# Patient Record
Sex: FEMALE | Race: WHITE | NOT HISPANIC OR LATINO | URBAN - METROPOLITAN AREA
[De-identification: names, ages, dates, MRNs, and addresses within clinical notes are randomized per-mention and may not be internally consistent; named-entity substitution may affect disease eponyms.]

---

## 2019-01-18 ENCOUNTER — IMPORTED ENCOUNTER (OUTPATIENT)
Dept: URBAN - METROPOLITAN AREA CLINIC 50 | Facility: CLINIC | Age: 59
End: 2019-01-18

## 2020-01-14 NOTE — PATIENT DISCUSSION
[place] Visual Field 36 point screen: I have reviewed the visual felipe, performed by Dr. Adelita Luong, both taped and untaped on this patient which demonstrate significant obstruction of the patient's peripheral visual field on both eyes.

## 2021-04-18 ASSESSMENT — VISUAL ACUITY
OS_SC: 20/40
OS_PH: @ 12 IN
OD_SC: 20/50
OD_BAT: 20/25
OS_OTHER: 20/30. 20/50.
OS_BAT: 20/30
OD_PH: @ 12 IN
OD_OTHER: 20/25. 20/30.

## 2021-04-18 ASSESSMENT — TONOMETRY
OS_IOP_MMHG: 15
OD_IOP_MMHG: 15

## 2021-04-23 ENCOUNTER — PREPPED CHART (OUTPATIENT)
Dept: URBAN - METROPOLITAN AREA CLINIC 53 | Facility: CLINIC | Age: 61
End: 2021-04-23

## 2021-04-26 ENCOUNTER — COMPREHENSIVE EXAM (OUTPATIENT)
Dept: URBAN - METROPOLITAN AREA CLINIC 53 | Facility: CLINIC | Age: 61
End: 2021-04-26

## 2021-04-26 DIAGNOSIS — H52.03: ICD-10-CM

## 2021-04-26 DIAGNOSIS — H25.13: ICD-10-CM

## 2021-04-26 DIAGNOSIS — H52.202: ICD-10-CM

## 2021-04-26 PROCEDURE — 92014 COMPRE OPH EXAM EST PT 1/>: CPT

## 2021-04-26 PROCEDURE — 92015 DETERMINE REFRACTIVE STATE: CPT

## 2021-04-26 ASSESSMENT — TONOMETRY
OS_IOP_MMHG: 16
OD_IOP_MMHG: 16

## 2021-04-26 ASSESSMENT — VISUAL ACUITY
OS_SC: 20/50
OD_SC: 20/60
OS_PH: 20/30
OD_PH: 20/25

## 2022-05-23 ENCOUNTER — COMPREHENSIVE EXAM (OUTPATIENT)
Dept: URBAN - METROPOLITAN AREA CLINIC 53 | Facility: CLINIC | Age: 62
End: 2022-05-23

## 2022-05-23 DIAGNOSIS — H43.813: ICD-10-CM

## 2022-05-23 DIAGNOSIS — H25.13: ICD-10-CM

## 2022-05-23 PROCEDURE — 92014 COMPRE OPH EXAM EST PT 1/>: CPT

## 2022-05-23 ASSESSMENT — VISUAL ACUITY
OS_GLARE: 20/40
OU_SC: J7
OD_SC: 20/50-3
OD_GLARE: 20/40
OD_GLARE: 20/40
OS_SC: 20/50
OS_GLARE: 20/40
OU_SC: 20/40

## 2022-05-23 ASSESSMENT — TONOMETRY
OD_IOP_MMHG: 14
OS_IOP_MMHG: 14

## 2022-06-14 ENCOUNTER — DIAGNOSTICS ONLY (OUTPATIENT)
Dept: URBAN - METROPOLITAN AREA CLINIC 53 | Facility: CLINIC | Age: 62
End: 2022-06-14

## 2022-06-14 DIAGNOSIS — H25.13: ICD-10-CM

## 2022-06-14 PROCEDURE — 92025IOL CORNEAL TOPOGRAPHY PREMIUM IOL

## 2022-06-14 PROCEDURE — 92136 OPHTHALMIC BIOMETRY: CPT

## 2022-06-14 ASSESSMENT — KERATOMETRY
OD_AXISANGLE2_DEGREES: 10
OS_AXISANGLE_DEGREES: 90
OD_AXISANGLE_DEGREES: 100
OD_K2POWER_DIOPTERS: 43.00
OS_AXISANGLE2_DEGREES: 180
OD_K1POWER_DIOPTERS: 43.62
OS_K1POWER_DIOPTERS: 44.12
OS_K2POWER_DIOPTERS: 43.62

## 2022-06-24 ENCOUNTER — PRE-OP/H&P (OUTPATIENT)
Dept: URBAN - METROPOLITAN AREA CLINIC 49 | Facility: CLINIC | Age: 62
End: 2022-06-24

## 2022-06-24 DIAGNOSIS — H43.813: ICD-10-CM

## 2022-06-24 DIAGNOSIS — H25.13: ICD-10-CM

## 2022-06-24 PROCEDURE — 92134 CPTRZ OPH DX IMG PST SGM RTA: CPT

## 2022-06-24 PROCEDURE — PREOP PRE OP VISIT

## 2022-06-24 ASSESSMENT — TONOMETRY
OS_IOP_MMHG: 16
OD_IOP_MMHG: 16

## 2022-06-24 ASSESSMENT — VISUAL ACUITY
OS_SC: 20/50-2
OD_SC: 20/50-2

## 2022-06-24 NOTE — PATIENT DISCUSSION
CATARACT SURGERY PLANNER - STANDARD IOL/NO FEMTO: Phacoemulsification with IOL: Eye: right|DOS: 7/6/22|Model: [id number]|Power: 22|Target: dist|Visc: duet|Omidria: yes|10% Phenylephrine: no|Epi-shugarcaine: yes|Phaco Setting: std|BSS+: no|Trypan Blue: no|CTR: no|Olive Tip: no|Atropine: no|Pupilloplasty: no|Notes: no.

## 2022-07-06 ENCOUNTER — SURGERY/PROCEDURE (OUTPATIENT)
Dept: URBAN - METROPOLITAN AREA SURGERY 16 | Facility: SURGERY | Age: 62
End: 2022-07-06

## 2022-07-06 ENCOUNTER — POST-OP (OUTPATIENT)
Dept: URBAN - METROPOLITAN AREA CLINIC 48 | Facility: CLINIC | Age: 62
End: 2022-07-06

## 2022-07-06 DIAGNOSIS — H25.11: ICD-10-CM

## 2022-07-06 DIAGNOSIS — Z96.1: ICD-10-CM

## 2022-07-06 DIAGNOSIS — Z98.41: ICD-10-CM

## 2022-07-06 PROCEDURE — 66984 XCAPSL CTRC RMVL W/O ECP: CPT

## 2022-07-06 ASSESSMENT — VISUAL ACUITY: OD_SC: 20/50

## 2022-07-06 ASSESSMENT — TONOMETRY: OD_IOP_MMHG: 17

## 2022-07-11 ENCOUNTER — PRE-OP/H&P (OUTPATIENT)
Dept: URBAN - METROPOLITAN AREA CLINIC 53 | Facility: CLINIC | Age: 62
End: 2022-07-11

## 2022-07-11 DIAGNOSIS — H25.12: ICD-10-CM

## 2022-07-11 DIAGNOSIS — Z96.1: ICD-10-CM

## 2022-07-11 PROCEDURE — 92136 - 2N OPHTHALMIC BIOMETRY BY PARTIAL COHERENCE INTERFEROMETRY WITH INTRAOCULAR LENS POWER CALCULATION

## 2022-07-11 PROCEDURE — PREOP PRE OP VISIT

## 2022-07-11 ASSESSMENT — VISUAL ACUITY
OS_SC: 20/60
OD_SC: 20/40-1
OS_PH: 20/25-1
OD_PH: 20/25-1
OU_SC: 20/50

## 2022-07-11 ASSESSMENT — TONOMETRY
OS_IOP_MMHG: 17
OD_IOP_MMHG: 17

## 2022-07-11 NOTE — PATIENT DISCUSSION
CATARACT SURGERY PLANNER - STANDARD IOL/NO FEMTO: Phacoemulsification with IOL: Eye: LEFT|DOS: 7/20/22|Model: [id number]|Power: 23|Target: PLANO|Visc: DUET|Omidria: YES|10% Phenylephrine: NO|Epi-shugarcaine: YES|Phaco Setting: STD|BSS+: NO|Trypan Blue: NO|CTR: NO|Olive Tip: NO|Atropine: NO|Pupilloplasty: NO|Notes: NO.

## 2022-07-20 ENCOUNTER — POST-OP (OUTPATIENT)
Dept: URBAN - METROPOLITAN AREA CLINIC 48 | Facility: CLINIC | Age: 62
End: 2022-07-20

## 2022-07-20 ENCOUNTER — SURGERY/PROCEDURE (OUTPATIENT)
Dept: URBAN - METROPOLITAN AREA SURGERY 16 | Facility: SURGERY | Age: 62
End: 2022-07-20

## 2022-07-20 DIAGNOSIS — H25.12: ICD-10-CM

## 2022-07-20 DIAGNOSIS — Z96.1: ICD-10-CM

## 2022-07-20 DIAGNOSIS — Z98.42: ICD-10-CM

## 2022-07-20 PROCEDURE — 66984 XCAPSL CTRC RMVL W/O ECP: CPT

## 2022-07-20 ASSESSMENT — TONOMETRY: OS_IOP_MMHG: 20

## 2022-07-20 ASSESSMENT — VISUAL ACUITY: OS_SC: 20/70-1

## 2022-07-29 ENCOUNTER — POST-OP (OUTPATIENT)
Dept: URBAN - METROPOLITAN AREA CLINIC 49 | Facility: CLINIC | Age: 62
End: 2022-07-29

## 2022-07-29 DIAGNOSIS — Z98.42: ICD-10-CM

## 2022-07-29 PROCEDURE — 99024 POSTOP FOLLOW-UP VISIT: CPT

## 2022-07-29 ASSESSMENT — VISUAL ACUITY
OD_PH: 20/20-1
OS_CC: 20/25-1
OD_CC: 20/30-1

## 2022-07-29 ASSESSMENT — TONOMETRY
OD_IOP_MMHG: 13
OS_IOP_MMHG: 13